# Patient Record
Sex: MALE | Race: BLACK OR AFRICAN AMERICAN | NOT HISPANIC OR LATINO
[De-identification: names, ages, dates, MRNs, and addresses within clinical notes are randomized per-mention and may not be internally consistent; named-entity substitution may affect disease eponyms.]

---

## 2023-02-27 ENCOUNTER — APPOINTMENT (OUTPATIENT)
Dept: UROLOGY | Facility: CLINIC | Age: 28
End: 2023-02-27
Payer: COMMERCIAL

## 2023-02-27 DIAGNOSIS — N47.6 BALANOPOSTHITIS: ICD-10-CM

## 2023-02-27 PROBLEM — Z00.00 ENCOUNTER FOR PREVENTIVE HEALTH EXAMINATION: Status: ACTIVE | Noted: 2023-02-27

## 2023-02-27 PROCEDURE — 99204 OFFICE O/P NEW MOD 45 MIN: CPT

## 2023-02-27 RX ORDER — CLOTRIMAZOLE AND BETAMETHASONE DIPROPIONATE 10; .5 MG/G; MG/G
1-0.05 CREAM TOPICAL TWICE DAILY
Qty: 1 | Refills: 0 | Status: ACTIVE | COMMUNITY
Start: 2023-02-27 | End: 1900-01-01

## 2023-03-05 NOTE — REVIEW OF SYSTEMS
[Dry Eyes] : dryness of the eyes [see HPI] : see HPI [Easy Bleeding] : a tendency for easy bleeding [Easy Bruising] : a tendency for easy bruising [Negative] : Endocrine

## 2023-03-06 ENCOUNTER — APPOINTMENT (OUTPATIENT)
Dept: UROLOGY | Facility: CLINIC | Age: 28
End: 2023-03-06
Payer: COMMERCIAL

## 2023-03-06 VITALS
DIASTOLIC BLOOD PRESSURE: 74 MMHG | WEIGHT: 240 LBS | HEIGHT: 73 IN | BODY MASS INDEX: 31.81 KG/M2 | SYSTOLIC BLOOD PRESSURE: 128 MMHG

## 2023-03-06 DIAGNOSIS — N50.819 TESTICULAR PAIN, UNSPECIFIED: ICD-10-CM

## 2023-03-06 PROCEDURE — 99214 OFFICE O/P EST MOD 30 MIN: CPT

## 2023-03-06 RX ORDER — MELOXICAM 7.5 MG/1
7.5 TABLET ORAL DAILY
Qty: 30 | Refills: 0 | Status: ACTIVE | COMMUNITY
Start: 2023-03-06 | End: 1900-01-01

## 2023-03-07 NOTE — HISTORY OF PRESENT ILLNESS
[FreeTextEntry1] : Language: English\par Accompanied by: Self\par Contact info:   [phone] \par Referring Provider/PCP: Low Ge\par \par Initial H&P 02/27/2023:\par Mr. Mireles is a very pleasant 27 year old gentleman who presents today for initial evaluation of several urologic issues.  \par \par He has a history of balanitis and urethral stricture.  In 2021, he was told his urethra was small and was dilated by Dr. Land. Pt was then told to self dilate at home after initial office dilation. Was dilating for 2y, most recently got down to 1-2x/month, but stopped a few months ago after running out of lubricant. \par \par Currently, he states that the foreskin appears to be "bridged" to the head, and he has skin tearing of his penis as well.  Was circumcised at birth, but thinks doctor may have left a little extra on, and a few years ago, he noticed the adhesions. \par \par No recent history of UTIs.\par \par On Saturday, he felt a sudden onset sharp pain in his left testicle/squeeze, and he felt a bump on the testicle since then. Currently has a very mild ache.  Very mild nausea associated with pain.  Has not noticed increasing swelling.  Denies skin changes, erythema. \par \par Additionally, since yesterday, he has had to strain to void and is having worsening hesitancy as well. Feels like something is building up inside before it comes out.  \par \par Does not ejaculate well - oozes rather shoots out. \par ---------------------------------------------------------------------------------------------------------------------------------------\par PMHx: Strained Back\par PSHx: None\par SHx: Maday alvarez, works as a  in a non-profit, lives in [place]\par FHx: Father - prostate ca\par \par All: PCN\par \par All non-gu pos ROS being addressed by PMD\par ---------------------------------------------------------------------------------------------------------------------------------------\par Physical Exam:\par General: NAD, sitting on exam table comfortably\par HEENT: NCAT, EOMI\par Resp: breathing comfortably on RA, b/l symm chest rise\par Cardiac: RRR\par Abd: SNTND\par Back: (-) CVAT b/l\par MSK: SARA w/ FROM\par Psych: appropriate affect\par : redundant foreskin with coronal adhesions, foreskin slightly tight but can be pulled back, skin is pale and abnormal appearing, glans with mild erythema on dorsal aspect, meatus tight measuring approx 4Fr,  b/l desc testes, no testicular masses or lesions, b/l vas palpable, small "bump" that patient pointed to is very small sebaceous cyst along inferior anterior aspect of left hemiscrotum\par \par ---------------------------------------------------------------------------------------------------------------------------------------\par Results:\par \par ---------------------------------------------------------------------------------------------------------------------------------------\par A/P: 27M with meatal stenosis, history of dilation now with recurrence of stenosis, redundant foreskin with adhesions and 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 \par \par 1. Balanposthitis\par We discussed that the erythema on his glans has the typical appearance of a candidal infection.  Because he has difficulty pulling the skin all the way back, the glans remains moist and unable to be fully cleaned, which may result in infection.  Recommended clotrimazole + betamethasone. \par \par 2. Foreskin adhesions + ?BXO\par This is likely the result of his incomplete circumcision in childhood, however his foreskin does look mildly abnormal, with a possible BXO appearance to it.  Given these findings, betamethasone was prescribed along with clotrimazole.  I explained to the patient that the steroid in the cream would help "soften" the adhesion as well as the foreskin itself, making it easier to pull back.  He understands that the cream likely will not cure the adhesions, and he will likely require a circ revision.  The complete removal of the abnormal foreskin will also be treatment of the bxo, if present.  Additionally, we discussed that a BXO dx is a pathologic one, and would only be made by biopsy (excisional by way of circ revision).\par \par 3. Meatal Stenosis\par He has failed prior dilation.  We discussed that if he has BXO, it is likely responsible for his meatal stenosis, which also puts him at risk for future urethral stricture formation.  At this time, it is unclear if the stenosis is localized to the meatus only, or if he has a longer stricture going more proximally.  Once the more acute/infecious issue is addressed, we will discuss additional workup of his stricture, including possible RUG prior to meatoplasty vs up front meatoplasty and reassessment, once the meatus is more open.\par \par Lastly, we discussed that his testicular pain may be a result of his obstructive urinary symptoms, however since it was mild to begin with, and resolving/almost resolved, we will monitor for recurrence.  The "bump" he felt is likely a very small sebaceous cyst, which I showed him on today's physical exam.  I do not recommend any intervention at this time.  He was counseled on the importance of presenting to the ER IMMEDIATELY, should he have sudden onset severe testicular pain +/- nausea/emesis, as this may be a surgical emergency and may result in loss of his testicle if delayed for any reason.\par \par I will see him again in 1 week to reassess the balanitis, or sooner PRN.\par \par Plan:\par - start clotrimazole + betamethasone\par - RTC 1 week or sooner PRN\par \par \par

## 2023-03-07 NOTE — HISTORY OF PRESENT ILLNESS
[FreeTextEntry1] : Language: English\par Accompanied by: Self\par Contact info:   [phone] \par Referring Provider: Low\par PCP: Mirlande Hunt\par \par Initial H&P 02/27/2023:\par Mr. Mireles is a very pleasant 27 year old gentleman who presents today for initial evaluation of several urologic issues.  \par \par He has a history of balanitis and urethral stricture.  In 2021, he was told his urethra was small and was dilated by Dr. Land. Pt was then told to self dilate at home after initial office dilation. Was dilating for 2y, most recently got down to 1-2x/month, but stopped a few months ago after running out of lubricant. \par \par Currently, he states that the foreskin appears to be "bridged" to the head, and he has skin tearing of his penis as well.  Was circumcised at birth, but thinks doctor may have left a little extra on, and a few years ago, he noticed the adhesions. \par \par No recent history of UTIs.\par \par On Saturday, he felt a sudden onset sharp pain in his left testicle/squeeze, and he felt a bump on the testicle since then. Currently has a very mild ache.  Very mild nausea associated with pain.  Has not noticed increasing swelling.  Denies skin changes, erythema. \par \par Additionally, since yesterday, he has had to strain to void and is having worsening hesitancy as well. Feels like something is building up inside before it comes out.  \par \par Does not ejaculate well - oozes rather shoots out. \par ---------------------------------------------------------------------------------------------------------------------------------------\par Interval History 03/06/2023:\lola Presents today for f/u. Last seen in clinic 2/27/23.  In the interim, he was started on Flexeril for his strained back.  Otherwise, no interval changes in health.  \par \par He feels that the clotrimazole cream is working.  States that the erythematous area is slowly resolving, and the prior normal "pink" appearance of the glans/foreskin is starting to become more visible.  Has not noticed a change in the adhesions.\par \par He has also noticed that his stream has gotten slightly weaker and spraying to the sides rather then coming out normally.  \par \par Additionally, his left orchalgia has now gotten slightly worse.  It used to be sporadic and was improving - now it's happening 1-2x/week.  Still a dull ache that radiates up the groin, but just happening more frequently now.  Not associated with anything specific.  Has not tried to take anything to make it better. \par ---------------------------------------------------------------------------------------------------------------------------------------\par PMHx: Strained Back\par PSHx: None\par SHx: Denies x3, works as a  in a non-profit, lives in [place]\par FHx: Father - prostate ca\par \par All: PCN\par \par All non-gu pos ROS being addressed by PMD\par ---------------------------------------------------------------------------------------------------------------------------------------\par Physical Exam:\par General: NAD, sitting on exam table comfortably\par HEENT: NCAT, EOMI\par Resp: breathing comfortably on RA, b/l symm chest rise\par Cardiac: RRR\par Abd: SNTND\par Back: (-) CVAT b/l\par MSK: RUIZ w/ FROM\par Psych: appropriate affect\par : redundant foreskin with coronal adhesions, foreskin slightly tight but can be pulled back, skin is pale and abnormal appearing, glans with mild erythema on dorsal aspect, meatus tight measuring approx 4Fr,  b/l desc testes, no testicular masses or lesions, b/l vas palpable, small "bump" that patient pointed to is very small sebaceous cyst along inferior anterior aspect of left hemiscrotum\par \par  3/6/23: persistently redundant foreskin with coronal adhesions and no changes in the appearance, foreskin still slightly tight but can be pulled back, the pale appearance of the skin has improved, previous dorsal glanular erythema has almost completely resolved, meatus stably tight with no changes from previous exam,  b/l desc testes, no tenderness on exam or specific areas of tenderness elicited, no testicular masses or lesions, b/l vas palpable, no changes in the appearance of the sebaceous cyst ---------------------------------------------------------------------------------------------------------------------------------------\par Results:\par \par ---------------------------------------------------------------------------------------------------------------------------------------\par A/P: 27M with meatal stenosis, history of dilation now with recurrence of stenosis, redundant foreskin with adhesions and balanoposthitis and 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 \par \par 1. Balanposthitis\par I am pleased with the overall improvement in the appearance of the glans.  I recommended he continue to apply the clotrimazole for 1 more week for complete resolution.\par \par 2. Foreskin adhesions + ?BXO\par The adhesions are likely the result of incomplete circumcision in his childhood.  He may still have a diagnosis of BXO, however with the significantly improved appearance of the foreskin after 1 week of antifungal + steroid, a candidal infection is more likely (as above). Based on the severity (or lack thereof) of his adhesions, I feel that a full circumcision at this time is not indicated.  I recommended takedown of the adhesions under local vs general at the time of meatoplasty instead of a full circumcision.  Given his body habitus, I feel that a full circumcision would put him at increased risk of further adhesion formation and possible buried penis.  I explained that following the procedure, he will need to apply a barrier cream (bacitracin, neosporin, vaseline, etc.) for several weeks to prevent recurrence of adhesions.  We will discuss in further detail at his next appt.\par \par 3. Meatal Stenosis\par He has failed prior dilation.  We discussed that the most optimal long term solution to prevent recurrence of meatal stenosis is a meatoplasty.  Prior to meatoplasty, however, I recommend he undergo a RUG to ensure he does not have additional narrowing more proximally.  Given his current symptoms and history of long term dilation/severe stenosis, we need to ensure that he does not need a more extensive procedure than meatoplasty alone. \par \par Additionally, we discussed that if the RUG is negative, it does not mean he won't have additional issues/narrowing in the future.  There's a very small possibility that the obstruction caused by his meatal stenosis may be keeping the rest of his urethra open (from the backpressure), and once the obstruction is repaired, it will allow the rest of his urethra to stricture down.  He understands that this is a rare occurrence, and the only way to uncover this problem would be to address his stenosis first.  \par \par He was agreeable to RUG, which I will arrange for 3/24. \par \par 4. Left Orchalgia\par Lastly, we discussed that his testicular pain may be a result of his obstructive urinary symptoms, however since it's now becoming slightly worse, I will prescribe a 4 week course of meloxicam and reasess at followup.  \par \par He was once again counseled on the importance of presenting to the ER IMMEDIATELY, should he have sudden onset severe testicular pain +/- nausea/emesis, as this may be a surgical emergency and may result in loss of his testicle if delayed for any reason.\par \par I will see him again on 3/24 for his RUG, or sooner PRN.\par \par Plan:\par - cont clotrimazole + betamethasone for 1 more week\par - start Meloxicam 7.5mg daily\par - RTC 3/24 ([address] [place] [address])\par ***Pt currently does not have a working telephone number.  He took down my [facility] email address and will notify me of any issues/changes in his clinical status or has difficulty scheduling f/u.

## 2023-03-24 ENCOUNTER — APPOINTMENT (OUTPATIENT)
Dept: UROLOGY | Facility: CLINIC | Age: 28
End: 2023-03-24
Payer: COMMERCIAL

## 2023-03-24 DIAGNOSIS — N35.919 UNSPECIFIED URETHRAL STRICTURE, MALE, UNSPECIFIED SITE: ICD-10-CM

## 2023-03-24 DIAGNOSIS — N47.8 OTHER DISORDERS OF PREPUCE: ICD-10-CM

## 2023-03-24 PROCEDURE — 74450 X-RAY URETHRA/BLADDER: CPT

## 2023-03-24 PROCEDURE — 99214 OFFICE O/P EST MOD 30 MIN: CPT | Mod: 25

## 2023-03-24 PROCEDURE — 51610 INJECTION FOR BLADDER X-RAY: CPT

## 2023-03-26 PROBLEM — N47.8 INCOMPLETE CIRCUMCISION: Status: ACTIVE | Noted: 2023-03-26

## 2023-03-26 NOTE — HISTORY OF PRESENT ILLNESS
[FreeTextEntry1] : Language: English\par Accompanied by: Self\par Contact info:  email - [email or web address]\par Referring Provider: Low\par PCP: Mirlande Hunt\par \par Initial H&P 02/27/2023:\par Mr. Mireles is a very pleasant 27 year old gentleman who presents today for initial evaluation of several urologic issues.  \par \par He has a history of balanitis and urethral stricture.  In 2021, he was told his urethra was small and was dilated by Dr. Land. Pt was then told to self dilate at home after initial office dilation. Was dilating for 2y, most recently got down to 1-2x/month, but stopped a few months ago after running out of lubricant. \par \par Currently, he states that the foreskin appears to be "bridged" to the head, and he has skin tearing of his penis as well.  Was circumcised at birth, but thinks doctor may have left a little extra on, and a few years ago, he noticed the adhesions. \par \par No recent history of UTIs.\par \par On Saturday, he felt a sudden onset sharp pain in his left testicle/squeeze, and he felt a bump on the testicle since then. Currently has a very mild ache.  Very mild nausea associated with pain.  Has not noticed increasing swelling.  Denies skin changes, erythema. \par \par Additionally, since yesterday, he has had to strain to void and is having worsening hesitancy as well. Feels like something is building up inside before it comes out.  \par \par Does not ejaculate well - oozes rather shoots out. \par ------------------------------------------------------------------------------------------------------\par Interval History 3/24/23:\lola Presents today for f/u and RUG. Last seen in clinic 03/06/2023.  No interval changes in health.  \par \par He feels that the clotrimazole cream continues to work.  Thinks the erythematous area has resolved.  Has not noticed a change in the adhesions.\par \par He has also recently started dilating his meatus again.  Stream has improved. \par ---------------------------------------------------------------------------------------------------------------------------------------\par PMHx: Strained Back\par PSHx: None\par SHx: Maday alvarez, works as a  in a non-profit, lives in [place]\par FHx: Father - prostate ca\par \par All: PCN\par \par All non-gu pos ROS being addressed by PMD\par ---------------------------------------------------------------------------------------------------------------------------------------\par Physical Exam:\par General: NAD, sitting on exam table comfortably\par HEENT: NCAT, EOMI\par Resp: breathing comfortably on RA, b/l symm chest rise\par Cardiac: RRR\par Abd: SNTND\par Back: (-) CVAT b/l\par MSK: SARA w/ FROM\par Psych: appropriate affect\par : redundant foreskin with coronal adhesions, foreskin slightly tight but can be pulled back, skin is pale and abnormal appearing, glans with mild erythema on dorsal aspect, meatus tight measuring approx 4Fr,  b/l desc testes, no testicular masses or lesions, b/l vas palpable, small "bump" that patient pointed to is very small sebaceous cyst along inferior anterior aspect of left hemiscrotum\par \par  3/6/23: persistently redundant foreskin with coronal adhesions and no changes in the appearance, foreskin still slightly tight but can be pulled back, the pale appearance of the skin has improved, previous dorsal glanular erythema has almost completely resolved, meatus stably tight with no changes from previous exam,  b/l desc testes, no tenderness on exam or specific areas of tenderness elicited, no testicular masses or lesions, b/l vas palpable, no changes in the appearance of the sebaceous cyst \par \par  3/24/23: no changes to foreskin adhesions from previous exam, glans erythema has completely resolved, persistently tight meatus\par ---------------------------------------------------------------------------------------------------------------------------------------\par Results:\par ---------------------------------------------------------------------------------------------------------------------------------------\par Procedures:\par Retrograde Urethrogram 3/24/23: Patient's glans was prepped with betadine.  Pt was placed in left lateral decubitus with his lower leg bent and top leg remaining straight.  Spot fluoroscopy did not show any bony deformities or any anomalies.  An 18g angiocath attached to a 60cc syringe was inserted into meatus and contrast was injected.  \par Findings: Normal and patent anterior urethra with contrast terminating at the EUS.  No filling defects or strictures proximal to meatus.  Contrast was immediately voided out and spot fluoroscopy did not show any defects during voiding.  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 \par \par 1. Balanitis\par I am pleased with the overall improvement in the appearance of the glans.  I feel that the balanitis has completely resolved and he can stop the clotrimazole cream.\par \par 2. Foreskin adhesions\par The adhesions are likely the result of incomplete circumcision in his childhood.  He may still have a diagnosis of BXO.  Based on the severity (or lack thereof) of his adhesions, I feel that a full circumcision at this time is not indicated.  I recommended takedown of the adhesions at the time of meatoplasty instead of a full circumcision.  Given his body habitus, I feel that a full circumcision would put him at increased risk of further adhesion formation and possible buried penis.  If he is interested in circumcision revision, he will need to lose weight first.  I explained that following the procedure, he will need to apply a barrier cream (bacitracin, neosporin, vaseline, etc.) for several weeks to prevent recurrence of adhesions.  We discussed post operative care in extensive detail, and if he does not regularly apply barrier cream, his adhesions will re-form.  \par \par 3. Meatal Stenosis\par He has failed prior dilation.  We discussed that the most optimal long term solution to prevent recurrence of meatal stenosis is a meatoplasty.  Risks of meatoplasty were discussed in detail, including but not limited to bleeding, infection, damage to surrounding structures, and recurrence of stenosis.  We also discussed that he has underlying stricture disease, it's possible that the back pressure from his stenosis is stenting his urethra open, and once the meatoplasty is performed and he is voiding normally, he may develop additional strictures proximal to his meatus.  This is an extremely rare occurrence, however.  \par \par \par Plan:\par - OR for meatoplasty and takedown of adhesions\par \par ***Pt continues not to have a working telephone number, and all communication is occurring via email.

## 2023-04-10 ENCOUNTER — TRANSCRIPTION ENCOUNTER (OUTPATIENT)
Age: 28
End: 2023-04-10

## 2023-04-10 NOTE — ASU PATIENT PROFILE, ADULT - ABLE TO REACH PT
yes Unable to reach patient, Dr. Mccray notified , as per surgeon patient can't be reach by phone only communicates via email time and instruction will be sent via email by surgeon/no

## 2023-04-10 NOTE — ASU PATIENT PROFILE, ADULT - NS PREOP UNDERSTANDS INFO
no solids after MN, water only before 11am, after that NPO/yes no solids after MN, water only befor NPO/yes

## 2023-04-11 ENCOUNTER — TRANSCRIPTION ENCOUNTER (OUTPATIENT)
Age: 28
End: 2023-04-11

## 2023-04-11 ENCOUNTER — APPOINTMENT (OUTPATIENT)
Dept: UROLOGY | Facility: AMBULATORY SURGERY CENTER | Age: 28
End: 2023-04-11

## 2023-04-11 ENCOUNTER — OUTPATIENT (OUTPATIENT)
Dept: OUTPATIENT SERVICES | Facility: HOSPITAL | Age: 28
LOS: 1 days | Discharge: HOME | End: 2023-04-11
Payer: COMMERCIAL

## 2023-04-11 VITALS
RESPIRATION RATE: 16 BRPM | TEMPERATURE: 97 F | SYSTOLIC BLOOD PRESSURE: 135 MMHG | WEIGHT: 315 LBS | HEART RATE: 105 BPM | HEIGHT: 75 IN | OXYGEN SATURATION: 99 % | DIASTOLIC BLOOD PRESSURE: 96 MMHG

## 2023-04-11 VITALS — TEMPERATURE: 99 F

## 2023-04-11 PROCEDURE — 53450 REVISION OF URETHRA: CPT | Mod: 22

## 2023-04-11 RX ORDER — HYDROMORPHONE HYDROCHLORIDE 2 MG/ML
0.5 INJECTION INTRAMUSCULAR; INTRAVENOUS; SUBCUTANEOUS ONCE
Refills: 0 | Status: DISCONTINUED | OUTPATIENT
Start: 2023-04-11 | End: 2023-04-11

## 2023-04-11 RX ORDER — SODIUM CHLORIDE 9 MG/ML
1000 INJECTION, SOLUTION INTRAVENOUS
Refills: 0 | Status: DISCONTINUED | OUTPATIENT
Start: 2023-04-11 | End: 2023-04-11

## 2023-04-11 RX ORDER — ACETAMINOPHEN 500 MG
3 TABLET ORAL
Qty: 84 | Refills: 0
Start: 2023-04-11 | End: 2023-04-17

## 2023-04-11 RX ORDER — OXYCODONE HYDROCHLORIDE 5 MG/1
5 TABLET ORAL ONCE
Refills: 0 | Status: DISCONTINUED | OUTPATIENT
Start: 2023-04-11 | End: 2023-04-11

## 2023-04-11 RX ORDER — CELECOXIB 200 MG/1
1 CAPSULE ORAL
Qty: 14 | Refills: 0
Start: 2023-04-11 | End: 2023-04-17

## 2023-04-11 RX ORDER — ONDANSETRON 8 MG/1
4 TABLET, FILM COATED ORAL ONCE
Refills: 0 | Status: DISCONTINUED | OUTPATIENT
Start: 2023-04-11 | End: 2023-04-11

## 2023-04-11 RX ADMIN — Medication 1 TABLET(S): at 12:12

## 2023-04-11 NOTE — ASU DISCHARGE PLAN (ADULT/PEDIATRIC) - CARE PROVIDER_API CALL
Mihai Mccray)  Urology  [address] [address]  Phone: [phone]  Fax: [phone]  Follow Up Time:

## 2023-04-11 NOTE — ASU DISCHARGE PLAN (ADULT/PEDIATRIC) - ASU DC SPECIAL INSTRUCTIONSFT
MEATOPLASTY    GENERAL: It is common to have blood in your urine after your procedure.  It may be pink or even red; and it is important to increase fluid intake to 2-3L of water per day to keep the urine as clear as possible. Please inform your doctor if you have a significant amount of clot in the urine or if you are unable to void at all.  The urine may clear and then become bloody again especially as you are more physically active. It is not uncommon to have some burning when you urinate, this will gradually improve. With a catheter in place, it is not uncommon to have occasional leakage or urine or blood around the catheter. Please call your urologist if this is excessive and/or the urine is not draining through the catheter into the bag.    CATHETER: Most patients are sent home with a Black catheter, which continuously drains the urine from the bladder. If you still have a catheter, the nurses will review instructions and care before you go home.     UROLOGIC MEDICATIONS:  The following medications may have been sent to your pharmacy for stent related discomfort: Flomax (tamsulosin) 0.4mg at bedtime until stent removed, Ditropan (oxybutynin) 5mg every 8 hours as needed for bladder spasms, and Pyridium (phenazopyridine) 100mg every 8 hours as needed for kidney/bladder discomfort for max 3 days (Pyridium will make your urine orange). For your convenience, all prescriptions are sent to [facility] pharmacy at [address] [place] [address], on the corner of [address] and [address] [place] [address].    PAIN: You may take Tylenol (acetaminophen) 650-975mg and/or Motrin (ibuprofen) 400-600mg, both available over the counter, for pain every 6 hours as needed. Do not exceed 4000mg of Tylenol (acetaminophen) daily. You may alternate these medications such that you take one or the other every 3 hours for around the clock pain coverage.    ANTIBIOTICS: You may be given a prescription for an antibiotic, please take this medication as instructed and be sure to complete the entire course. For your convenience, all prescriptions are sent to [facility] pharmacy at [address] [place] [address], on the corner of [address] and [address] [place] [address].    STOOL SOFTENERS: Do not allow yourself to become constipated as straining may cause bleeding. Take stool softeners or a laxative (ex. Miralax, Colace, Senokot, ExLax, etc), available over the counter, if needed.    ANTICOAGULATION: If you are taking any blood thinning medications, please discuss with your urologist prior to restarting these medications unless otherwise specified.    BATHING: You may shower.    DIET: You may resume your regular diet and regular medication regimen.    ACTIVITY: No heavy lifting or strenuous exercise until you are evaluated at your post-operative appointment. Otherwise, you may return to your usual level of physical activity.    FOLLOW-UP: Please follow up with Dr. Mccray. If you did not already schedule your post-operative appointment, please call your urologist to schedule and follow-up appointment.    CALL YOUR UROLOGIST IF: You have any bleeding that does not stop, inability to void >8 hours, fever over 100.4 F, chills, persistent nausea/vomiting, changes in your incision concerning for infection, or if your pain is not controlled on your discharge pain medications. MEATOPLASTY    GENERAL: It is common to have blood in your urine after your procedure.  It may be pink or even red; and it is important to increase fluid intake to 2-3L of water per day to keep the urine as clear as possible. Please inform your doctor if you have a significant amount of clot in the urine or if you are unable to void at all.  The urine may clear and then become bloody again especially as you are more physically active. It is not uncommon to have some burning when you urinate, this will gradually improve.     WOUND CARE: Please apply bacitracin ointment to the head of your penis, then wrap in Neela (white gauze) from the head of your penis down toward the base, then wrap in Coban (tan, stretchy dressing) from the head of your penis down toward the base. This dressing should be changed at least once daily, or if the dressing falls off/after showering. The dressing should remain in place for 21 hours, off 3 hours daily. Bacitracin ointment must always be applied to the penis to prevent adhesions from re-forming.     CATHETER: Some patients are sent home with a Black catheter, which continuously drains the urine from the bladder. If you still have a catheter, the nurses will review instructions and care before you go home.     PAIN: You may take Tylenol (acetaminophen) 975mg every 6 hours and Celebrex 100mg twice daily for 7 days for pain control. Do not exceed 4000mg of Tylenol (acetaminophen) daily. These were sent to your pharmacy.    STOOL SOFTENERS: Do not allow yourself to become constipated as straining may cause bleeding. Take stool softeners or a laxative (ex. Miralax, Colace, Senokot, ExLax, etc), available over the counter, if needed.    ANTICOAGULATION: If you are taking any blood thinning medications, please discuss with your urologist prior to restarting these medications unless otherwise specified.    BATHING: You may shower starting 4/12.    DIET: You may resume your regular diet and regular medication regimen.    ACTIVITY: No heavy lifting or strenuous exercise until you are evaluated at your post-operative appointment. Otherwise, you may return to your usual level of physical activity.    FOLLOW-UP: Please follow up with Dr. Mccray. If you did not already schedule your post-operative appointment, please call your urologist to schedule and follow-up appointment.    CALL YOUR UROLOGIST IF: You have any bleeding that does not stop, inability to void >8 hours, fever over 100.4 F, chills, persistent nausea/vomiting, changes in your incision concerning for infection, or if your pain is not controlled on your discharge pain medications.

## 2023-04-11 NOTE — PRE-ANESTHESIA EVALUATION ADULT - NSANTHOSAYNRD_GEN_A_CORE
No. STEPHAN screening performed.  STOP BANG Legend: 0-2 = LOW Risk; 3-4 = INTERMEDIATE Risk; 5-8 = HIGH Risk

## 2023-04-11 NOTE — ASU DISCHARGE PLAN (ADULT/PEDIATRIC) - NS MD DC FALL RISK RISK
For information on Fall & Injury Prevention, visit: [email or web address] OR  [email or web address] OR  https://www.cdc.gov/steadi/patient.html

## 2023-04-12 ENCOUNTER — NON-APPOINTMENT (OUTPATIENT)
Age: 28
End: 2023-04-12

## 2023-04-12 PROBLEM — U07.1 COVID-19: Chronic | Status: ACTIVE | Noted: 2023-04-11

## 2023-04-28 ENCOUNTER — NON-APPOINTMENT (OUTPATIENT)
Age: 28
End: 2023-04-28

## 2023-05-03 ENCOUNTER — APPOINTMENT (OUTPATIENT)
Dept: UROLOGY | Facility: CLINIC | Age: 28
End: 2023-05-03
Payer: COMMERCIAL

## 2023-05-03 VITALS
SYSTOLIC BLOOD PRESSURE: 150 MMHG | HEART RATE: 101 BPM | HEIGHT: 73 IN | DIASTOLIC BLOOD PRESSURE: 104 MMHG | BODY MASS INDEX: 41.75 KG/M2 | OXYGEN SATURATION: 99 % | WEIGHT: 315 LBS

## 2023-05-03 PROCEDURE — 99024 POSTOP FOLLOW-UP VISIT: CPT

## 2023-05-08 NOTE — HISTORY OF PRESENT ILLNESS
[FreeTextEntry1] : Language: English\par Accompanied by: Self\par Contact info:  email - [email or web address]\par Referring Provider: Low\par PCP: Mirlande Hunt\par \par Initial H&P 02/27/2023:\par Mr. Mireles is a very pleasant 27 year old gentleman who presents today for initial evaluation of several urologic issues.  \par \par He has a history of balanitis and urethral stricture.  In 2021, he was told his urethra was small and was dilated by Dr. Land. Pt was then told to self dilate at home after initial office dilation. Was dilating for 2y, most recently got down to 1-2x/month, but stopped a few months ago after running out of lubricant. \par \par Currently, he states that the foreskin appears to be "bridged" to the head, and he has skin tearing of his penis as well.  Was circumcised at birth, but thinks doctor may have left a little extra on, and a few years ago, he noticed the adhesions. \par \par No recent history of UTIs.\par \par On Saturday, he felt a sudden onset sharp pain in his left testicle/squeeze, and he felt a bump on the testicle since then. Currently has a very mild ache.  Very mild nausea associated with pain.  Has not noticed increasing swelling.  Denies skin changes, erythema. \par \par Additionally, since yesterday, he has had to strain to void and is having worsening hesitancy as well. Feels like something is building up inside before it comes out.  \par \par Does not ejaculate well - oozes rather shoots out. \par ------------------------------------------------------------------------------------------------------\par 04/11/23: s/p Urethromeatoplasty, lysis of penile adhesions\par Findings: severely narrowed urethral meatus to approx 4Fr, opened up to approx 16Fr, dorsal plate required small incision with mucosal advancement, foreskin adhesions were extremely dense. \par ------------------------------------------------------------------------------------------------------\par Interval History 5/3/23:\lola Presents today for f/u post op visit. Last seen in clinic 3/24/23.  \par \par He is POD22 from procedure above.  Overall doing well with no complaints.  Denies pain or any issues.  States that skin is still a little raw - continuing to apply cream 2-3x/day. Voiding well with no complaints.  \par \par Says the stream looks like it's still splitting, but then it comes back together.  Doesn't spray. Stream is strong. \par \par Girlfriend currently admitted at the hospital with autoimmune encephalitis. \par ---------------------------------------------------------------------------------------------------------------------------------------\par PMHx: Strained Back\par PSHx: None\par SHx: Maday alvarez, works as a  in a non-profit, lives in [place]\par FHx: Father - prostate ca\par \par All: PCN\par \par All non-gu pos ROS being addressed by PMD\par ---------------------------------------------------------------------------------------------------------------------------------------\par Physical Exam:\par General: NAD, sitting on exam table comfortably\par HEENT: NCAT, EOMI\par Resp: breathing /comfortably on RA, b/l symm chest rise\par Cardiac: RRR\par Abd: SNTND\par Back: (-) CVAT b/l\par MSK: SARA w/ FROM\par Psych: appropriate affect\par : redundant foreskin with coronal adhesions, foreskin slightly tight but can be pulled back, skin is pale and abnormal appearing, glans with mild erythema on dorsal aspect, meatus tight measuring approx 4Fr,  b/l desc testes, no testicular masses or lesions, b/l vas palpable, small "bump" that patient pointed to is very small sebaceous cyst along inferior anterior aspect of left hemiscrotum\par \par  3/6/23: persistently redundant foreskin with coronal adhesions and no changes in the appearance, foreskin still slightly tight but can be pulled back, the pale appearance of the skin has improved, previous dorsal glanular erythema has almost completely resolved, meatus stably tight with no changes from previous exam,  b/l desc testes, no tenderness on exam or specific areas of tenderness elicited, no testicular masses or lesions, b/l vas palpable, no changes in the appearance of the sebaceous cyst \par \par  3/24/23: no changes to foreskin adhesions from previous exam, glans erythema has completely resolved, persistently tight meatus\par \par  5/3/23: well-healed and open meatus, skin still slightly stiff, glans mildly erythematous along with skin just proximal to the corona where adhesions were taken down, otherwise appears to be well-healed with no concerns at this time.\par ---------------------------------------------------------------------------------------------------------------------------------------\par Results:\par ---------------------------------------------------------------------------------------------------------------------------------------\par Procedures:\par Retrograde Urethrogram 3/24/23: Patient's glans was prepped with betadine.  Pt was placed in left lateral decubitus with his lower leg bent and top leg remaining straight.  Spot fluoroscopy did not show any bony deformities or any anomalies.  An 18g angiocath attached to a 60cc syringe was inserted into meatus and contrast was injected.  \par Findings: Normal and patent anterior urethra with contrast terminating at the EUS.  No filling defects or strictures proximal to meatus.  Contrast was immediately voided out and spot fluoroscopy did not show any defects during voiding.  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  He is now s/p urethromeatoplasty and lysis of foreskin adhesions. \par \par I had an extensive discussion with the patient regarding the findings on physical exam today. I personally reviewed the intraoperative findings with him.\par \par We will plan on seeing the patient in the upcoming 3 months time period.  Further expectations/instructions were described in detail to the patient.\par \par He may resume normal sexual activity once the skin erythema and that "raw" sensation resolves.  He will continue applying A&D vs vaseline vs aquaphor ointment until then. \par \par Plan:\par - RTC 3 months\par - cont oint 2-3x/day\par \par ***Pt continues not to have a working telephone number, and all communication is occurring via email.

## 2023-05-18 ENCOUNTER — NON-APPOINTMENT (OUTPATIENT)
Age: 28
End: 2023-05-18

## 2023-07-20 ENCOUNTER — APPOINTMENT (OUTPATIENT)
Dept: UROLOGY | Facility: CLINIC | Age: 28
End: 2023-07-20
Payer: COMMERCIAL

## 2023-07-20 VITALS
HEIGHT: 73 IN | DIASTOLIC BLOOD PRESSURE: 86 MMHG | BODY MASS INDEX: 41.75 KG/M2 | WEIGHT: 315 LBS | SYSTOLIC BLOOD PRESSURE: 134 MMHG

## 2023-07-20 DIAGNOSIS — Z11.3 ENCOUNTER FOR SCREENING FOR INFECTIONS WITH A PREDOMINANTLY SEXUAL MODE OF TRANSMISSION: ICD-10-CM

## 2023-07-20 DIAGNOSIS — R39.9 UNSPECIFIED SYMPTOMS AND SIGNS INVOLVING THE GENITOURINARY SYSTEM: ICD-10-CM

## 2023-07-20 DIAGNOSIS — N47.5 ADHESIONS OF PREPUCE AND GLANS PENIS: ICD-10-CM

## 2023-07-20 PROCEDURE — 99214 OFFICE O/P EST MOD 30 MIN: CPT

## 2023-07-24 LAB
APPEARANCE: ABNORMAL
BACTERIA UR CULT: NORMAL
BACTERIA: NEGATIVE /HPF
BILIRUBIN URINE: NEGATIVE
BLOOD URINE: ABNORMAL
CALCIUM OXALATE CRYSTALS: PRESENT
CAST: 0 /LPF
COLOR: NORMAL
EPITHELIAL CELLS: 0 /HPF
GLUCOSE QUALITATIVE U: NEGATIVE MG/DL
HAV IGM SER QL: NONREACTIVE
HBV CORE IGM SER QL: NONREACTIVE
HBV SURFACE AG SER QL: NONREACTIVE
HCV AB SER QL: NONREACTIVE
HCV S/CO RATIO: 0.08 S/CO
HIV1+2 AB SPEC QL IA.RAPID: NONREACTIVE
HSV 1 IGG TYPE-SPECIFIC AB: 15 INDEX
HSV 2 IGG TYPE-SPECIFIC AB: <0.9 INDEX
KETONES URINE: 15 MG/DL
LEUKOCYTE ESTERASE URINE: NEGATIVE
MICROSCOPIC-UA: NORMAL
NITRITE URINE: NEGATIVE
PH URINE: 5.5
PROTEIN URINE: NORMAL MG/DL
RED BLOOD CELLS URINE: NORMAL /HPF
REVIEW: NORMAL
SPECIFIC GRAVITY URINE: >1.03
T PALLIDUM AB SER QL IA: NEGATIVE
UROBILINOGEN URINE: 0.2 MG/DL
WHITE BLOOD CELLS URINE: 0 /HPF

## 2023-07-27 NOTE — HISTORY OF PRESENT ILLNESS
[FreeTextEntry1] : Language: English\par Accompanied by: Self\par Contact info:  email - [email or web address]\par Referring Provider: Low\par PCP: Mirlande Hunt\par \par Initial H&P 02/27/2023:\par Mr. Mireles is a very pleasant 27 year old gentleman who presents today for initial evaluation of several urologic issues.  \par \par He has a history of balanitis and urethral stricture.  In 2021, he was told his urethra was small and was dilated by Dr. Land. Pt was then told to self dilate at home after initial office dilation. Was dilating for 2y, most recently got down to 1-2x/month, but stopped a few months ago after running out of lubricant. \par \par Currently, he states that the foreskin appears to be "bridged" to the head, and he has skin tearing of his penis as well.  Was circumcised at birth, but thinks doctor may have left a little extra on, and a few years ago, he noticed the adhesions. \par \par No recent history of UTIs.\par \par On Saturday, he felt a sudden onset sharp pain in his left testicle/squeeze, and he felt a bump on the testicle since then. Currently has a very mild ache.  Very mild nausea associated with pain.  Has not noticed increasing swelling.  Denies skin changes, erythema. \par \par Additionally, since yesterday, he has had to strain to void and is having worsening hesitancy as well. Feels like something is building up inside before it comes out.  \par \par Does not ejaculate well - oozes rather shoots out. \par ------------------------------------------------------------------------------------------------------\par 04/11/23: s/p Urethromeatoplasty, lysis of penile adhesions\par Findings: severely narrowed urethral meatus to approx 4Fr, opened up to approx 16Fr, dorsal plate required small incision with mucosal advancement, foreskin adhesions were extremely dense. \par ------------------------------------------------------------------------------------------------------\par Interval History 7/20/23:\par Presents today for f/u. Last seen in clinic 5/3/23.  \par \par Overall doing well, however he had an area of concern along his glans - emailed me a picture.  Looked like a small pimple, however it was very difficult to tell from the image, therefore I recommended he come in for evaluation.  Denies pain or any issues.  \par \par Stream still splitting intermittently but then it comes back together.  Stream is strong. \par \par Girlfrienheber was previously admitted at the hospital with autoimmune encephalitis, now in rehab.  \par ---------------------------------------------------------------------------------------------------------------------------------------\par PMHx: Strained Back\par PSHx: None\par SHx: Maday alvarez, works as a  in a non-profit, lives in [place]\par FHx: Father - prostate ca\par \par All: PCN\par \par All non-gu pos ROS being addressed by PMD\par ---------------------------------------------------------------------------------------------------------------------------------------\par Physical Exam:\par General: NAD, sitting on exam table comfortably\par HEENT: NCAT, EOMI\par Resp: breathing /comfortably on RA, b/l symm chest rise\par Cardiac: RRR\par Abd: SNTND\par Back: (-) CVAT b/l\par MSK: SARA w/ FROM\par Psych: appropriate affect\par : redundant foreskin with coronal adhesions, foreskin slightly tight but can be pulled back, skin is pale and abnormal appearing, glans with mild erythema on dorsal aspect, meatus tight measuring approx 4Fr,  b/l desc testes, no testicular masses or lesions, b/l vas palpable, small "bump" that patient pointed to is very small sebaceous cyst along inferior anterior aspect of left hemiscrotum\par \par  3/6/23: persistently redundant foreskin with coronal adhesions and no changes in the appearance, foreskin still slightly tight but can be pulled back, the pale appearance of the skin has improved, previous dorsal glanular erythema has almost completely resolved, meatus stably tight with no changes from previous exam,  b/l desc testes, no tenderness on exam or specific areas of tenderness elicited, no testicular masses or lesions, b/l vas palpable, no changes in the appearance of the sebaceous cyst \par \par  3/24/23: no changes to foreskin adhesions from previous exam, glans erythema has completely resolved, persistently tight meatus\par \par  5/3/23: well-healed and open meatus, skin still slightly stiff, glans mildly erythematous along with skin just proximal to the corona where adhesions were taken down, otherwise appears to be well-healed with no concerns at this time.\par \par  07/20/23: well-healed and open meatus, glans well-healed, no obvious/concerning lesions seen resembling the one in the picture, some mild adhesions seen forming proximally. \par ---------------------------------------------------------------------------------------------------------------------------------------\par Results:\par ---------------------------------------------------------------------------------------------------------------------------------------\par Procedures:\par Retrograde Urethrogram 3/24/23: Patient's glans was prepped with betadine.  Pt was placed in left lateral decubitus with his lower leg bent and top leg remaining straight.  Spot fluoroscopy did not show any bony deformities or any anomalies.  An 18g angiocath attached to a 60cc syringe was inserted into meatus and contrast was injected.  \par Findings: Normal and patent anterior urethra with contrast terminating at the EUS.  No filling defects or strictures proximal to meatus.  Contrast was immediately voided out and spot fluoroscopy did not show any defects during voiding.  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  He is now s/p urethromeatoplasty and lysis of foreskin adhesions. \par \par I had an extensive discussion with the patient regarding the findings on physical exam today. He was concerned about the newly forming adhesions, and because of his weight, I do not recommend revision circumcision at this time, especially in the initial healing period.  He asked if steroid cream would help, and given that the adhesions were still fairly soft and thin, I recommended betamethasone and stretching x 4 weeks.  \par \par I will see him again in 2-4 weeks to reassess. \par \par Plan:\par - start betamethasone\par - RTC 2-4 weeks\par \par ***Pt continues not to have a working telephone number, and all communication is occurring via email.

## 2023-08-02 ENCOUNTER — APPOINTMENT (OUTPATIENT)
Dept: UROLOGY | Facility: CLINIC | Age: 28
End: 2023-08-02

## 2023-08-03 ENCOUNTER — APPOINTMENT (OUTPATIENT)
Dept: UROLOGY | Facility: CLINIC | Age: 28
End: 2023-08-03

## 2023-11-13 ENCOUNTER — APPOINTMENT (OUTPATIENT)
Dept: UROLOGY | Facility: CLINIC | Age: 28
End: 2023-11-13
Payer: COMMERCIAL

## 2023-11-13 PROCEDURE — 99215 OFFICE O/P EST HI 40 MIN: CPT

## 2023-11-13 PROCEDURE — ZZZZZ: CPT

## 2023-11-13 RX ORDER — MELOXICAM 7.5 MG/1
7.5 TABLET ORAL DAILY
Qty: 30 | Refills: 0 | Status: ACTIVE | COMMUNITY
Start: 2023-11-13 | End: 1900-01-01

## 2023-11-13 RX ORDER — BETAMETHASONE DIPROPIONATE 0.5 MG/G
0.05 CREAM TOPICAL TWICE DAILY
Qty: 1 | Refills: 1 | Status: ACTIVE | COMMUNITY
Start: 2023-07-20 | End: 1900-01-01

## 2024-02-21 ENCOUNTER — LABORATORY RESULT (OUTPATIENT)
Age: 29
End: 2024-02-21

## 2024-02-21 ENCOUNTER — APPOINTMENT (OUTPATIENT)
Dept: UROLOGY | Facility: CLINIC | Age: 29
End: 2024-02-21
Payer: COMMERCIAL

## 2024-02-21 VITALS
BODY MASS INDEX: 41.75 KG/M2 | HEIGHT: 73 IN | SYSTOLIC BLOOD PRESSURE: 130 MMHG | DIASTOLIC BLOOD PRESSURE: 90 MMHG | WEIGHT: 315 LBS

## 2024-02-21 PROCEDURE — 99214 OFFICE O/P EST MOD 30 MIN: CPT

## 2024-03-05 ENCOUNTER — TRANSCRIPTION ENCOUNTER (OUTPATIENT)
Age: 29
End: 2024-03-05

## 2024-03-05 LAB
ALBUMIN SERPL ELPH-MCNC: 4.7 G/DL
ALP BLD-CCNC: 110 U/L
ALT SERPL-CCNC: 27 U/L
ANION GAP SERPL CALC-SCNC: 16 MMOL/L
APPEARANCE: ABNORMAL
AST SERPL-CCNC: 21 U/L
BILIRUB SERPL-MCNC: 0.4 MG/DL
BILIRUBIN URINE: NEGATIVE
BLOOD URINE: ABNORMAL
BUN SERPL-MCNC: 12 MG/DL
C TRACH RRNA SPEC QL NAA+PROBE: NOT DETECTED
CALCIUM SERPL-MCNC: 10.1 MG/DL
CHLORIDE SERPL-SCNC: 101 MMOL/L
CHOLEST SERPL-MCNC: 212 MG/DL
CO2 SERPL-SCNC: 24 MMOL/L
COLOR: NORMAL
CREAT SERPL-MCNC: 0.92 MG/DL
EGFR: 116 ML/MIN/1.73M2
ESTIMATED AVERAGE GLUCOSE: 120 MG/DL
GLUCOSE QUALITATIVE U: NEGATIVE MG/DL
GLUCOSE SERPL-MCNC: 99 MG/DL
HAV IGM SER QL: NONREACTIVE
HBA1C MFR BLD HPLC: 5.8 %
HBV CORE IGM SER QL: NONREACTIVE
HBV SURFACE AG SER QL: NONREACTIVE
HCT VFR BLD CALC: 48.6 %
HCV AB SER QL: NONREACTIVE
HCV S/CO RATIO: 0.06 S/CO
HDLC SERPL-MCNC: 56 MG/DL
HGB BLD-MCNC: 15.5 G/DL
HIV1+2 AB SPEC QL IA.RAPID: NONREACTIVE
HSV 1 IGG TYPE-SPECIFIC AB: 18 INDEX
HSV 2 IGG TYPE-SPECIFIC AB: <0.9 INDEX
KETONES URINE: NEGATIVE MG/DL
LDLC SERPL CALC-MCNC: 134 MG/DL
LEUKOCYTE ESTERASE URINE: NEGATIVE
MCHC RBC-ENTMCNC: 28.3 PG
MCHC RBC-ENTMCNC: 31.9 GM/DL
MCV RBC AUTO: 88.8 FL
N GONORRHOEA RRNA SPEC QL NAA+PROBE: NOT DETECTED
NITRITE URINE: NEGATIVE
NONHDLC SERPL-MCNC: 156 MG/DL
PH URINE: 5.5
PLATELET # BLD AUTO: 288 K/UL
POTASSIUM SERPL-SCNC: 4.1 MMOL/L
PROT SERPL-MCNC: 8.1 G/DL
PROTEIN URINE: NEGATIVE MG/DL
RBC # BLD: 5.47 M/UL
RBC # FLD: 13.4 %
SODIUM SERPL-SCNC: 141 MMOL/L
SOURCE AMPLIFICATION: NORMAL
SPECIFIC GRAVITY URINE: 1.02
T PALLIDUM AB SER QL IA: NEGATIVE
TRIGL SERPL-MCNC: 128 MG/DL
UROBILINOGEN URINE: 0.2 MG/DL
WBC # FLD AUTO: 8.98 K/UL

## 2024-04-22 NOTE — HISTORY OF PRESENT ILLNESS
[FreeTextEntry1] : Language: English Accompanied by: Self Contact info:  email - [email or web address], [phone] (new number) Referring Provider: Low PCP: Mirlande Hunt  Initial H&P 02/27/2023: Mr. Mireles is a very pleasant 27 year old gentleman who presents today for initial evaluation of several urologic issues.    He has a history of balanitis and urethral stricture.  In 2021, he was told his urethra was small and was dilated by Dr. Land. Pt was then told to self dilate at home after initial office dilation. Was dilating for 2y, most recently got down to 1-2x/month, but stopped a few months ago after running out of lubricant.   Currently, he states that the foreskin appears to be "bridged" to the head, and he has skin tearing of his penis as well.  Was circumcised at birth, but thinks doctor may have left a little extra on, and a few years ago, he noticed the adhesions.   No recent history of UTIs.  On Saturday, he felt a sudden onset sharp pain in his left testicle/squeeze, and he felt a bump on the testicle since then. Currently has a very mild ache.  Very mild nausea associated with pain.  Has not noticed increasing swelling.  Denies skin changes, erythema.   Additionally, since yesterday, he has had to strain to void and is having worsening hesitancy as well. Feels like something is building up inside before it comes out.    Does not ejaculate well - oozes rather shoots out.  ------------------------------------------------------------------------------------------------------ 04/11/23: s/p Urethromeatoplasty, lysis of penile adhesions Findings: severely narrowed urethral meatus to approx 4Fr, opened up to approx 16Fr, dorsal plate required small incision with mucosal advancement, foreskin adhesions were extremely dense.  ------------------------------------------------------------------------------------------------------ Interval History 2/21/24: Presents today for f/u. Last seen in clinic 11/13/23.  No interval changes in health.   He previously had an area of concern along his glans - emailed me a picture.  Looked like a small pimple, however it was very difficult to tell from the image, therefore I recommended he come in for evaluation, which was assessed at a prior visit. On exam at the time, his meatus appeared WNL, and he had evidence of new adhesions forming more proximally. He was prescribed betamethasone cream for the adhesions and recommended to f/u in 2-4 weeks, however had been unable to follow up right away.    He was also previously complaining of stream still splitting intermittently but then it comes back together.  Stream is strong. He applied the betamethasone, which helped with some of the adhesions but not all. Previously also c/o urine "welling up" before coming out and mild orchialgia. At previous visits, he was more bothered by his foreskin than his voiding.   Girlfriend was previously admitted at the hospital with autoimmune encephalitis, now in rehab.    Today, he states that he has had increased dribbling lately.  Has not dilated at all since last visit.  No recent testicular pain, maybe once in the last month. Never used betamethasone  Gf doing well, now fully recovered  Being laid off, might have insurance issues --------------------------------------------------------------------------------------------------------------------------------------- PMHx: Strained Back PSHx: None SHx: Maday alvarez, works as a  in a non-profit, lives in [place] FHx: Father - prostate ca  All: PCN  All non-gu pos ROS being addressed by PMD --------------------------------------------------------------------------------------------------------------------------------------- Physical Exam: General: NAD, sitting on exam table comfortably HEENT: NCAT, EOMI Resp: breathing /comfortably on RA, b/l symm chest rise Cardiac: RRR Abd: SNTND Back: (-) CVAT b/l MSK: SARA carrion/ FROM Psych: appropriate affect : redundant foreskin with coronal adhesions, foreskin slightly tight but can be pulled back, skin is pale and abnormal appearing, glans with mild erythema on dorsal aspect, meatus tight measuring approx 4Fr,  b/l desc testes, no testicular masses or lesions, b/l vas palpable, small "bump" that patient pointed to is very small sebaceous cyst along inferior anterior aspect of left hemiscrotum   3/6/23: persistently redundant foreskin with coronal adhesions and no changes in the appearance, foreskin still slightly tight but can be pulled back, the pale appearance of the skin has improved, previous dorsal glanular erythema has almost completely resolved, meatus stably tight with no changes from previous exam,  b/l desc testes, no tenderness on exam or specific areas of tenderness elicited, no testicular masses or lesions, b/l vas palpable, no changes in the appearance of the sebaceous cyst    3/24/23: no changes to foreskin adhesions from previous exam, glans erythema has completely resolved, persistently tight meatus   5/3/23: well-healed and open meatus, skin still slightly stiff, glans mildly erythematous along with skin just proximal to the corona where adhesions were taken down, otherwise appears to be well-healed with no concerns at this time.   07/20/23: well-healed and open meatus, glans well-healed, no obvious/concerning lesions seen resembling the one in the picture, some mild adhesions seen forming proximally.   11/13/23: meatus slightly more narrow than on previous exams, difficult to examine fossa, however it does appear mildly narrowed as well, glans well-healed, no obvious/concerning lesions, significant adhesions have no re-formed.    02/21/24: meatus stably narrow, difficult to examine fossa, however it does appear mildly narrowed as well, glans well-healed, no obvious/concerning lesions, (+) adhesions on today's exam, though not as severe as pre-op.  --------------------------------------------------------------------------------------------------------------------------------------- Results: --------------------------------------------------------------------------------------------------------------------------------------- Procedures: Retrograde Urethrogram 3/24/23: Patient's glans was prepped with betadine.  Pt was placed in left lateral decubitus with his lower leg bent and top leg remaining straight.  Spot fluoroscopy did not show any bony deformities or any anomalies.  An 18g angiocath attached to a 60cc syringe was inserted into meatus and contrast was injected.   Findings: Normal and patent anterior urethra with contrast terminating at the EUS.  No filling defects or strictures proximal to meatus.  Contrast was immediately voided out and spot fluoroscopy did not show any defects during voiding.    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  He is now s/p urethromeatoplasty and lysis of foreskin adhesions. On exam today, he now has more adhesions that have re-formed since his last visit, and his meatus and fossa continue to appear slightly more narrow on today's physical exam.   1. Fossa Navicularis Stricture Once again, we had a long discussion today about his urethra and next steps in management.  Although it is not an emergency, he may find himself experiencing more urinary symptoms over the next several months, and he is at increased risk for UTI from this narrowing as well.    We discussed urethroplasty with CHRISTINA once again in generalities.  He is worried that with his impending layoff, he will not be able to undergo surgery any time soon.  As such, he will contact me when he is ready to discuss further, and he will f/u at that point.    2. Penile Adhesions Given the stable adhesions, discussed that he may continue holding off on any ointment, or he may restart, given the unknown status of his future insurance/followups.    Plan: - may re-start betamethasone - RTC after upcoming work issues addressed   Pt has my email, and I assured him that even if he is having insurance issues and cannot be seen in the office, he may contact me by email at any time to discuss any issues, or if he has any questions.   Of note, he also has a new phone number and is able to now communicate by phone (New # written above).   I spent a total of 32 minutes on face-to-face counseling, coordination of care, review of prior records and clinical documentation.

## (undated) DEVICE — TUBING SUCTION NONCONDUCTIVE 6MM X 12FT

## (undated) DEVICE — GLV 8 PROTEXIS (WHITE)

## (undated) DEVICE — DRSG SUPPORTER ADULT 3" WAISTBAND LRG

## (undated) DEVICE — BOSTON SCIENTIFIC UROLOK II SCOPE ADAPTOR

## (undated) DEVICE — ELCTR BOVIE PENCIL BLADE 10FT

## (undated) DEVICE — PACK PERI GYN

## (undated) DEVICE — CATH IV SAFE BC 18G X 1.16" (GREEN)

## (undated) DEVICE — LONE STAR RETRACTOR RING 32.5CM X 18.3CM DISP

## (undated) DEVICE — SUT VICRYL 4-0 27" RB-1 UNDYED

## (undated) DEVICE — TUBING RANGER FLUID IRRIGATION SET DISP

## (undated) DEVICE — DRAPE MAGNETIC INSTRUMENT MEDIUM

## (undated) DEVICE — DRAINAGE BAG URINARY 2L

## (undated) DEVICE — SUT MONOCRYL 3-0 27" PS-1 UNDYED

## (undated) DEVICE — VENODYNE/SCD SLEEVE CALF MEDIUM

## (undated) DEVICE — PREP BETADINE SPONGE STICKS

## (undated) DEVICE — SYR ASEPTO

## (undated) DEVICE — PACK GENERAL MINOR

## (undated) DEVICE — DRAPE THYROID 77" X 123"

## (undated) DEVICE — SUT VLOC 90 4-0 9" CV-23 VIOLET

## (undated) DEVICE — NDL HYPO SAFE 25G X 1.5" (ORANGE)

## (undated) DEVICE — LONE STAR ELASTIC STAY HOOK 12MM BLUNT

## (undated) DEVICE — NDL SPINAL 18G X 3.5" (PINK)

## (undated) DEVICE — BIPOLAR CORD AMERICAN BIOSURGICAL 12FT (BLUE)

## (undated) DEVICE — MARKING PEN W RULER

## (undated) DEVICE — DRAPE IRRIGATION POUCH 19X23"

## (undated) DEVICE — NDL COUNTER FOAM AND MAGNET 20-40

## (undated) DEVICE — SUT CHROMIC 4-0 18" PS-2

## (undated) DEVICE — DRSG STERISTRIPS 0.5 X 4"

## (undated) DEVICE — DRSG COMBINE 5X9"

## (undated) DEVICE — DRAIN PENROSE .25" X 18" LATEX

## (undated) DEVICE — ELCTR ECG CONDUCTIVE ADHESIVE

## (undated) DEVICE — SUT PDS II 5-0 27" RB-1

## (undated) DEVICE — POSITIONER FOAM EGG CRATE ULNAR 2PCS (PINK)

## (undated) DEVICE — URETERAL CATH RED RUBBER 18FR (RED)

## (undated) DEVICE — VESSEL LOOP EXTRA MAXI-BLUE 0.200" X 22"

## (undated) DEVICE — SUT SILK 2-0 30" SH

## (undated) DEVICE — DRSG KERLIX ROLL 4.5"

## (undated) DEVICE — STAPLER SKIN PROXIMATE

## (undated) DEVICE — FRAZIER SUCTION TIP 8FR

## (undated) DEVICE — LUBRICATING JELLY ONESHOT 1.25OZ

## (undated) DEVICE — FOLEY TRAY 16FR LF URINE METER SURESTEP

## (undated) DEVICE — DRSG CURITY GAUZE SPONGE 4 X 4" 12-PLY

## (undated) DEVICE — SUT CHROMIC 4-0 27" RB-1